# Patient Record
Sex: FEMALE | Race: WHITE
[De-identification: names, ages, dates, MRNs, and addresses within clinical notes are randomized per-mention and may not be internally consistent; named-entity substitution may affect disease eponyms.]

---

## 2017-06-21 ENCOUNTER — HOSPITAL ENCOUNTER (OUTPATIENT)
Dept: HOSPITAL 62 - WI | Age: 52
End: 2017-06-21
Attending: FAMILY MEDICINE
Payer: OTHER GOVERNMENT

## 2017-06-21 DIAGNOSIS — Z12.31: Primary | ICD-10-CM

## 2017-06-21 PROCEDURE — G0202 SCR MAMMO BI INCL CAD: HCPCS

## 2017-06-21 PROCEDURE — 77067 SCR MAMMO BI INCL CAD: CPT

## 2017-06-21 NOTE — WOMENS IMAGING REPORT
EXAM DESCRIPTION:  BILAT SCREENING MAMMO W/CAD



COMPLETED DATE/TIME:  6/21/2017 1:08 pm



REASON FOR STUDY:  Z12.31, ROUTINE SCREENING MAMMO Z12.31  ENCNTR SCREEN MAMMOGRAM FOR MALIGNANT NEOP
LASM OF ZACK



COMPARISON:  Multiple since 2013



TECHNIQUE:  Standard craniocaudal and mediolateral oblique views of each breast recorded using digita
l acquisition.  Additional "push-back" craniocaudal and mediolateral oblique images acquired.



LIMITATIONS:  None.



FINDINGS:  IMPLANTS: Bilateral subpectoral implants.

Findings present which are benign by mammographic criteria.  No suspicious masses, calcifications or 
architectural distortion.

Read with the assistance of CAD.

.[facility] - R2 Cenova Version 1.3

.[facility] - R2 Cenova Version 1.3

.\[facility]"" [facility] - R2 Cenova Version 2.4

.[facility] - R2 Cenova Version 2.4

.[facility] - R2  Version 9.2

Benign mammographic findings may include one or more of the following:  Smooth masses, popcorn/rim/co
arse calcifications, asymmetries, post-procedure changes, and lesions with long-standing stability.



IMPRESSION:  BENIGN MAMMOGRAPHIC FINDINGS.  BIRADS 2



BREAST DENSITY:  d. The breasts are extremely dense, which lowers the sensitivity of mammography.



BIRAD:  2 BENIGN FINDING(S)



RECOMMENDATION:  ROUTINE SCREENING

Please consider bilateral screening tomosynthesis in June 2018 given extremely dense fibroglandular t
issue



COMMENT:  The patient has been notified of the results by letter per MQSA requirements. Additional no
tification policies are in place for contacting patient with suspicious or incomplete findings.

Quality ID #225: The American College of Radiology recommends an annual screening mammogram for women
 aged 40 years or over. This facility utilizes a reminder system to ensure that all patients receive 
reminder letters, and/or direct phone calls for appointments. This includes reminders for routine scr
eening mammograms, diagnostic mammograms, or other Breast Imaging Interventions when appropriate.  Th
is patient will be placed in the appropriate reminder system.

The American College of Radiology (ACR) has developed recommendations for screening MRI of the breast
s in certain patient populations, to be used in conjunction with mammography.  Breast MRI surveillanc
e may be appropriate for women with more than 20% lifetime risk of developing breast cancer  as deter
mined by genetic testing, significant family history of the disease, or history of mantle radiation f
or Hodgkins Disease.  ACR Practice Guidelines 2008.



TECHNICAL DOCUMENTATION:  FINDING NUMBER: (1)

ASSESSMENT: (1)

JOB ID:  [id number]

 2011 [facility]- All Rights Reserved

## 2018-11-15 ENCOUNTER — HOSPITAL ENCOUNTER (OUTPATIENT)
Dept: HOSPITAL 62 - WI | Age: 53
End: 2018-11-15
Attending: PHYSICIAN ASSISTANT
Payer: COMMERCIAL

## 2018-11-15 DIAGNOSIS — Z12.31: Primary | ICD-10-CM

## 2018-11-15 PROCEDURE — 77067 SCR MAMMO BI INCL CAD: CPT

## 2018-11-15 NOTE — WOMENS IMAGING REPORT
EXAM DESCRIPTION:  BILAT SCREENING MAMMO W/CAD



COMPLETED DATE/TIME:  11/15/2018 1:59 pm



REASON FOR STUDY:  BILATERAL SCREENING MAMMO /Z12.31 Z12.31  ENCNTR SCREEN MAMMOGRAM FOR MALIGNANT NE
OPLASM OF ZACK



COMPARISON:  Multiple since 2013



TECHNIQUE:  Standard craniocaudal and mediolateral oblique views of each breast recorded using digita
l acquisition.  Additional "push-back" craniocaudal and mediolateral oblique images acquired.



LIMITATIONS:  None.



FINDINGS:  IMPLANTS: Bilateral subpectoral implants.

Findings present which are benign by mammographic criteria.  No suspicious masses, calcifications or 
architectural distortion.

Read with the assistance of CAD.

.[facility] - R2 Cenova Version 1.3

.[facility] - R2 Cenova Version 1.3

.[facility] - R2 Cenova Version 2.4

.[facility] - R2 Cenova Version 2.4

.[facility] - R2  Version 9.2

Benign mammographic findings may include one or more of the following:  Smooth masses, popcorn/rim/co
arse calcifications, asymmetries, post-procedure changes, and lesions with long-standing stability.



IMPRESSION:  BENIGN MAMMOGRAPHIC FINDINGS.  BIRADS 2



BREAST DENSITY:  c. The breasts are heterogeneously dense, which may obscure small masses.



BIRAD:  2 BENIGN FINDING(S)



RECOMMENDATION:  ROUTINE SCREENING

Please consider bilateral screening tomosynthesis in November 2019 given heterogeneously dense tissue




COMMENT:  The patient has been notified of the results by letter per SA requirements. Additional no
tification policies are in place for contacting patient with suspicious or incomplete findings.

Quality ID #225: The American College of Radiology recommends an annual screening mammogram for women
 aged 40 years or over. This facility utilizes a reminder system to ensure that all patients receive 
reminder letters, and/or direct phone calls for appointments. This includes reminders for routine scr
eening mammograms, diagnostic mammograms, or other Breast Imaging Interventions when appropriate.  Th
is patient will be placed in the appropriate reminder system.

The American College of Radiology (ACR) has developed recommendations for screening MRI of the breast
s in certain patient populations, to be used in conjunction with mammography.  Breast MRI surveillanc
e may be appropriate for women with more than 20% lifetime risk of developing breast cancer  as deter
mined by genetic testing, significant family history of the disease, or history of mantle radiation f
or Hodgkins Disease.  ACR Practice Guidelines 2008.



TECHNICAL DOCUMENTATION:  FINDING NUMBER: (1)

ASSESSMENT: (1)

JOB ID:  [id number]

 2011 [email or web address]- All Rights Reserved



Reading location - IP/workstation name: [facility]-RR2

## 2019-12-20 ENCOUNTER — HOSPITAL ENCOUNTER (OUTPATIENT)
Dept: HOSPITAL 62 - OD | Age: 54
End: 2019-12-20
Attending: OTOLARYNGOLOGY
Payer: COMMERCIAL

## 2019-12-20 DIAGNOSIS — J30.9: Primary | ICD-10-CM

## 2019-12-20 PROCEDURE — 82785 ASSAY OF IGE: CPT

## 2019-12-20 PROCEDURE — 86003 ALLG SPEC IGE CRUDE XTRC EA: CPT

## 2019-12-20 PROCEDURE — 36415 COLL VENOUS BLD VENIPUNCTURE: CPT
